# Patient Record
Sex: FEMALE | Race: WHITE | NOT HISPANIC OR LATINO | Employment: FULL TIME | URBAN - METROPOLITAN AREA
[De-identification: names, ages, dates, MRNs, and addresses within clinical notes are randomized per-mention and may not be internally consistent; named-entity substitution may affect disease eponyms.]

---

## 2023-02-07 PROBLEM — M25.562 KNEE PAIN, LEFT: Status: ACTIVE | Noted: 2023-02-07

## 2023-02-07 PROBLEM — M54.30 SCIATICA: Status: ACTIVE | Noted: 2023-02-07

## 2023-02-07 PROBLEM — E78.00 PURE HYPERCHOLESTEROLEMIA: Status: ACTIVE | Noted: 2023-02-07

## 2023-02-07 PROBLEM — R06.2 WHEEZE: Status: ACTIVE | Noted: 2023-02-07

## 2023-02-07 PROBLEM — I10 BENIGN HYPERTENSION: Status: ACTIVE | Noted: 2023-02-07

## 2023-02-07 PROBLEM — D75.1 ACQUIRED POLYCYTHEMIA: Status: ACTIVE | Noted: 2023-02-07

## 2023-02-07 PROBLEM — R73.03 PREDIABETES: Status: ACTIVE | Noted: 2023-02-07

## 2023-02-07 PROBLEM — B07.0 PLANTAR WART, LEFT FOOT: Status: ACTIVE | Noted: 2023-02-07

## 2023-02-07 PROBLEM — R51.9 HEADACHE: Status: ACTIVE | Noted: 2023-02-07

## 2023-02-07 PROBLEM — L98.9 SKIN LESION: Status: ACTIVE | Noted: 2023-02-07

## 2023-02-07 PROBLEM — H81.399 PERIPHERAL VERTIGO: Status: ACTIVE | Noted: 2023-02-07

## 2023-02-07 PROBLEM — R50.9 FEVER WITH CHILLS: Status: ACTIVE | Noted: 2023-02-07

## 2023-02-07 PROBLEM — R20.2 TINGLING OF UPPER EXTREMITY: Status: ACTIVE | Noted: 2023-02-07

## 2023-02-07 PROBLEM — J06.9 ACUTE URI: Status: ACTIVE | Noted: 2023-02-07

## 2023-02-07 PROBLEM — J30.9 ALLERGIC RHINITIS: Status: ACTIVE | Noted: 2023-02-07

## 2023-02-07 PROBLEM — E55.9 VITAMIN D DEFICIENCY: Status: ACTIVE | Noted: 2023-02-07

## 2023-02-07 PROBLEM — E78.1 HYPERTRIGLYCERIDEMIA: Status: ACTIVE | Noted: 2023-02-07

## 2023-02-07 PROBLEM — R43.2 TASTE IMPAIRMENT: Status: ACTIVE | Noted: 2023-02-07

## 2023-02-07 PROBLEM — K62.5 RECTAL BLEED: Status: ACTIVE | Noted: 2023-02-07

## 2023-02-07 PROBLEM — F17.200 SMOKING: Status: ACTIVE | Noted: 2023-02-07

## 2023-02-07 PROBLEM — R42 DIZZINESS: Status: ACTIVE | Noted: 2023-02-07

## 2023-02-07 PROBLEM — M25.511 RIGHT SHOULDER PAIN: Status: ACTIVE | Noted: 2023-02-07

## 2023-02-07 RX ORDER — LOSARTAN POTASSIUM AND HYDROCHLOROTHIAZIDE 12.5; 5 MG/1; MG/1
1 TABLET ORAL DAILY
COMMUNITY
Start: 2021-12-22 | End: 2023-03-21 | Stop reason: SDUPTHER

## 2023-02-07 RX ORDER — ATORVASTATIN CALCIUM 20 MG/1
1 TABLET, FILM COATED ORAL DAILY
COMMUNITY
Start: 2020-03-06 | End: 2023-03-21 | Stop reason: SDUPTHER

## 2023-03-13 LAB
ALANINE AMINOTRANSFERASE (SGPT) (U/L) IN SER/PLAS: 18 U/L (ref 7–45)
ALBUMIN (G/DL) IN SER/PLAS: 4.7 G/DL (ref 3.4–5)
ALKALINE PHOSPHATASE (U/L) IN SER/PLAS: 86 U/L (ref 33–110)
ANION GAP IN SER/PLAS: 13 MMOL/L (ref 10–20)
APPEARANCE, URINE: CLEAR
ASPARTATE AMINOTRANSFERASE (SGOT) (U/L) IN SER/PLAS: 16 U/L (ref 9–39)
BILIRUBIN TOTAL (MG/DL) IN SER/PLAS: 0.6 MG/DL (ref 0–1.2)
BILIRUBIN, URINE: NEGATIVE
BLOOD, URINE: NEGATIVE
CALCIDIOL (25 OH VITAMIN D3) (NG/ML) IN SER/PLAS: 79 NG/ML
CALCIUM (MG/DL) IN SER/PLAS: 9.6 MG/DL (ref 8.6–10.3)
CARBON DIOXIDE, TOTAL (MMOL/L) IN SER/PLAS: 31 MMOL/L (ref 21–32)
CHLORIDE (MMOL/L) IN SER/PLAS: 98 MMOL/L (ref 98–107)
CHOLESTEROL IN LDL (MG/DL) IN SER/PLAS BY DIRECT ASSAY: 127 MG/DL (ref 0–129)
COLOR, URINE: ABNORMAL
CREATININE (MG/DL) IN SER/PLAS: 0.57 MG/DL (ref 0.5–1.05)
ERYTHROCYTE DISTRIBUTION WIDTH (RATIO) BY AUTOMATED COUNT: 13.9 % (ref 11.5–14.5)
ERYTHROCYTE MEAN CORPUSCULAR HEMOGLOBIN CONCENTRATION (G/DL) BY AUTOMATED: 34 G/DL (ref 32–36)
ERYTHROCYTE MEAN CORPUSCULAR VOLUME (FL) BY AUTOMATED COUNT: 94 FL (ref 80–100)
ERYTHROCYTES (10*6/UL) IN BLOOD BY AUTOMATED COUNT: 4.81 X10E12/L (ref 4–5.2)
ESTIMATED AVERAGE GLUCOSE FOR HBA1C: 123 MG/DL
GFR FEMALE: >90 ML/MIN/1.73M2
GLUCOSE (MG/DL) IN SER/PLAS: 89 MG/DL (ref 74–99)
GLUCOSE, URINE: NEGATIVE MG/DL
HEMATOCRIT (%) IN BLOOD BY AUTOMATED COUNT: 45.3 % (ref 36–46)
HEMOGLOBIN (G/DL) IN BLOOD: 15.4 G/DL (ref 12–16)
HEMOGLOBIN A1C/HEMOGLOBIN TOTAL IN BLOOD: 5.9 %
KETONES, URINE: ABNORMAL MG/DL
LEUKOCYTE ESTERASE, URINE: NEGATIVE
LEUKOCYTES (10*3/UL) IN BLOOD BY AUTOMATED COUNT: 7.2 X10E9/L (ref 4.4–11.3)
NITRITE, URINE: NEGATIVE
PH, URINE: 7 (ref 5–8)
PLATELETS (10*3/UL) IN BLOOD AUTOMATED COUNT: 242 X10E9/L (ref 150–450)
POTASSIUM (MMOL/L) IN SER/PLAS: 4.1 MMOL/L (ref 3.5–5.3)
PROTEIN TOTAL: 6.5 G/DL (ref 6.4–8.2)
PROTEIN, URINE: NEGATIVE MG/DL
SODIUM (MMOL/L) IN SER/PLAS: 138 MMOL/L (ref 136–145)
SPECIFIC GRAVITY, URINE: 1 (ref 1–1.03)
THYROTROPIN (MIU/L) IN SER/PLAS BY DETECTION LIMIT <= 0.05 MIU/L: 1.31 MIU/L (ref 0.44–3.98)
UREA NITROGEN (MG/DL) IN SER/PLAS: 10 MG/DL (ref 6–23)
UROBILINOGEN, URINE: <2 MG/DL (ref 0–1.9)

## 2023-03-14 LAB
ALBUMIN (MG/L) IN URINE: <7 MG/L
ALBUMIN/CREATININE (UG/MG) IN URINE: NORMAL UG/MG CRT (ref 0–30)
CREATININE (MG/DL) IN URINE: 30.1 MG/DL (ref 20–320)

## 2023-03-21 ENCOUNTER — OFFICE VISIT (OUTPATIENT)
Dept: PRIMARY CARE | Facility: CLINIC | Age: 58
End: 2023-03-21
Payer: COMMERCIAL

## 2023-03-21 VITALS
SYSTOLIC BLOOD PRESSURE: 110 MMHG | BODY MASS INDEX: 31.02 KG/M2 | WEIGHT: 204 LBS | DIASTOLIC BLOOD PRESSURE: 70 MMHG | TEMPERATURE: 96.9 F

## 2023-03-21 DIAGNOSIS — F17.200 SMOKING: ICD-10-CM

## 2023-03-21 DIAGNOSIS — E78.00 PURE HYPERCHOLESTEROLEMIA: ICD-10-CM

## 2023-03-21 DIAGNOSIS — I10 BENIGN HYPERTENSION: Primary | ICD-10-CM

## 2023-03-21 DIAGNOSIS — R73.03 PREDIABETES: ICD-10-CM

## 2023-03-21 DIAGNOSIS — Z12.39 SCREENING BREAST EXAMINATION: ICD-10-CM

## 2023-03-21 PROCEDURE — 99213 OFFICE O/P EST LOW 20 MIN: CPT | Performed by: INTERNAL MEDICINE

## 2023-03-21 PROCEDURE — 3078F DIAST BP <80 MM HG: CPT | Performed by: INTERNAL MEDICINE

## 2023-03-21 PROCEDURE — 3074F SYST BP LT 130 MM HG: CPT | Performed by: INTERNAL MEDICINE

## 2023-03-21 RX ORDER — ATORVASTATIN CALCIUM 20 MG/1
20 TABLET, FILM COATED ORAL DAILY
Qty: 90 TABLET | Refills: 1 | Status: SHIPPED | OUTPATIENT
Start: 2023-03-21 | End: 2023-09-05 | Stop reason: SDUPTHER

## 2023-03-21 RX ORDER — LOSARTAN POTASSIUM AND HYDROCHLOROTHIAZIDE 12.5; 5 MG/1; MG/1
1 TABLET ORAL DAILY
Qty: 90 TABLET | Refills: 1 | Status: SHIPPED | OUTPATIENT
Start: 2023-03-21 | End: 2023-09-05 | Stop reason: SDUPTHER

## 2023-03-21 ASSESSMENT — PATIENT HEALTH QUESTIONNAIRE - PHQ9
2. FEELING DOWN, DEPRESSED OR HOPELESS: NOT AT ALL
SUM OF ALL RESPONSES TO PHQ9 QUESTIONS 1 AND 2: 0
1. LITTLE INTEREST OR PLEASURE IN DOING THINGS: NOT AT ALL

## 2023-03-21 NOTE — ASSESSMENT & PLAN NOTE
#Smoking:  -The patient is a chronic smoker, 18 cigarettes/day for almost 38 years. Currently smokes 8-10 cigarettes/day  -The patient was counseled about the importance of smoking cessation and its hazards on her health.  -The patient said she will be trying to quit smoking, resources were also provided for her when she is ready.

## 2023-03-21 NOTE — ASSESSMENT & PLAN NOTE
#Prediabetes  -Last HbA1c 6->5.9, patient's father has history of diabetes.  -Patient advised about weight loss, exercise and wise use of carbohydrates   -Repeat HBA1C next visit.

## 2023-03-21 NOTE — PROGRESS NOTES
Subjective   Patient ID: Jami Craig is a 57 y.o. female with PMH of HTN, HLD, chronic everyday smoker , obesity who presents for a follow up apt. States she has been trying to loose weight by cutting down carbohydrates and increasing protein in her diet. Patient was able to loose weight from 219lbs (11/2022)->204lbs (3/2023). BP in office was 11/70. 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 States she has been smoking 18cigarettes/day since age 39years and currently  smokes 8-10cigarettes/day. Denied n/v, f/c, HA, chest pain, SOB, palpitations, changes to urination or BM.    Objective   /70   Temp 36.1 °C (96.9 °F)   Wt 92.5 kg (204 lb)   BMI 31.02 kg/m²     Physical Exam  Constitutional:       General: She is not in acute distress.     Appearance: She is obese. She is not ill-appearing.   HENT:      Head: Atraumatic.      Nose: No congestion or rhinorrhea.   Cardiovascular:      Rate and Rhythm: Normal rate and regular rhythm.      Pulses: Normal pulses.      Heart sounds: Normal heart sounds. No murmur heard.     No friction rub. No gallop.   Pulmonary:      Effort: Pulmonary effort is normal. No respiratory distress.      Breath sounds: No wheezing or rales.   Chest:      Chest wall: No tenderness.   Abdominal:      General: Bowel sounds are normal. There is no distension.      Palpations: Abdomen is soft.      Tenderness: There is no abdominal tenderness. There is no guarding or rebound.   Musculoskeletal:         General: No swelling or tenderness. Normal range of motion.      Cervical back: No rigidity.   Skin:     General: Skin is warm and dry.   Neurological:      General: No focal deficit present.      Mental Status: She is alert and oriented to person, place, and time.   Psychiatric:         Mood and Affect: Mood normal.         Behavior: Behavior normal.         Assessment/Plan   Problem List Items Addressed This Visit          Circulatory    Benign  hypertension - Primary     HTN  Stable  C/w current meds  BW reviewed  Refills sent         Relevant Medications    losartan-hydrochlorothiazide (Hyzaar) 50-12.5 mg tablet       Endocrine/Metabolic    Prediabetes     #Prediabetes  -Last HbA1c 6->5.9, patient's father has history of diabetes.  -Patient advised about weight loss, exercise and wise use of carbohydrates   -Repeat HBA1C next visit.            Other    Pure hypercholesterolemia     Well controlled  Continue with statins  Labs done recently  Refills sent         Relevant Medications    atorvastatin (Lipitor) 20 mg tablet    Smoking     #Smoking:  -The patient is a chronic smoker, 18 cigarettes/day for almost 38 years. Currently smokes 8-10 cigarettes/day  -The patient was counseled about the importance of smoking cessation and its hazards on her health.  -The patient said she will be trying to quit smoking, resources were also provided for her when she is ready.            Other Visit Diagnoses       Screening breast examination        Relevant Orders    BI mammo bilateral screening tomosynthesis        -CBC, CMP, TSH with reflex to T4, vitamin D, lipid panel from last visit were normal.  -Urinalysis, urine albumin spot test: normal   -Patient's mammography was within normal last April, screening in 1 year. Requisition for April 2023 given.  -Patient mentioned she was following for OB/GYN and she had a last Pap smear was normal, records are not available in the system and the patient will bring with next visit.  -She got her booster vaccine against COVID in October, she got her pneumococcal vaccine today, she declined influenza vaccine.      Follow-up in 6 months (Sep 2023)

## 2023-06-01 DIAGNOSIS — R92.8 ABNORMAL MAMMOGRAM OF RIGHT BREAST: Primary | ICD-10-CM

## 2023-09-05 ENCOUNTER — TELEMEDICINE (OUTPATIENT)
Dept: PRIMARY CARE | Facility: CLINIC | Age: 58
End: 2023-09-05
Payer: COMMERCIAL

## 2023-09-05 DIAGNOSIS — I10 BENIGN HYPERTENSION: ICD-10-CM

## 2023-09-05 DIAGNOSIS — E55.9 VITAMIN D DEFICIENCY: Primary | ICD-10-CM

## 2023-09-05 DIAGNOSIS — E78.00 PURE HYPERCHOLESTEROLEMIA: ICD-10-CM

## 2023-09-05 PROCEDURE — 99213 OFFICE O/P EST LOW 20 MIN: CPT | Performed by: INTERNAL MEDICINE

## 2023-09-05 RX ORDER — ATORVASTATIN CALCIUM 20 MG/1
20 TABLET, FILM COATED ORAL DAILY
Qty: 90 TABLET | Refills: 1 | Status: SHIPPED | OUTPATIENT
Start: 2023-09-05 | End: 2024-02-13 | Stop reason: SDUPTHER

## 2023-09-05 RX ORDER — LOSARTAN POTASSIUM AND HYDROCHLOROTHIAZIDE 12.5; 5 MG/1; MG/1
1 TABLET ORAL DAILY
Qty: 90 TABLET | Refills: 1 | Status: SHIPPED | OUTPATIENT
Start: 2023-09-05 | End: 2024-02-13 | Stop reason: SDUPTHER

## 2023-09-05 ASSESSMENT — ENCOUNTER SYMPTOMS
CONSTITUTIONAL NEGATIVE: 1
CARDIOVASCULAR NEGATIVE: 1
RESPIRATORY NEGATIVE: 1
GASTROINTESTINAL NEGATIVE: 1

## 2023-09-05 NOTE — PROGRESS NOTES
Subjective   Patient ID: Jami Craig is a 58 y.o. female who presents for Follow-up.  HPI    Review of Systems   Constitutional: Negative.    Respiratory: Negative.     Cardiovascular: Negative.    Gastrointestinal: Negative.        Objective   Physical Exam  Neurological:      Mental Status: She is alert.   Psychiatric:         Mood and Affect: Mood normal.         Assessment/Plan   Problem List Items Addressed This Visit       Benign hypertension    Relevant Medications    losartan-hydrochlorothiazide (Hyzaar) 50-12.5 mg tablet    Pure hypercholesterolemia    Relevant Medications    atorvastatin (Lipitor) 20 mg tablet    Vitamin D deficiency - Primary        The patient is here for a follow up on chronic medical problems.  His medications were reviewed, pharmacy updated, notes reviewed, prior labs reviewed.       HTN.  Well controlled.  Continue with current medications.  Check BP at home daily.  Report to us if the numbers are higher than 130/80.        HLD  Stable  Continue with statins  Check lipids    An interactive audio and video telecommunication system which permits real time communications between the patient (at the originating site) and provider (at the distant site) was utilized to provide this telehealth service.    Verbal consent was requested and obtained from the patient on the day of encounter.      Mee Maciel MD

## 2023-10-31 ENCOUNTER — OFFICE VISIT (OUTPATIENT)
Dept: PRIMARY CARE | Facility: CLINIC | Age: 58
End: 2023-10-31
Payer: COMMERCIAL

## 2023-10-31 ENCOUNTER — LAB (OUTPATIENT)
Dept: LAB | Facility: LAB | Age: 58
End: 2023-10-31
Payer: COMMERCIAL

## 2023-10-31 VITALS — SYSTOLIC BLOOD PRESSURE: 130 MMHG | WEIGHT: 228 LBS | BODY MASS INDEX: 34.67 KG/M2 | DIASTOLIC BLOOD PRESSURE: 84 MMHG

## 2023-10-31 DIAGNOSIS — E55.9 VITAMIN D DEFICIENCY: ICD-10-CM

## 2023-10-31 DIAGNOSIS — I10 BENIGN HYPERTENSION: ICD-10-CM

## 2023-10-31 DIAGNOSIS — Z13.220 LIPID SCREENING: Primary | ICD-10-CM

## 2023-10-31 DIAGNOSIS — E78.00 PURE HYPERCHOLESTEROLEMIA: ICD-10-CM

## 2023-10-31 DIAGNOSIS — Z13.220 LIPID SCREENING: ICD-10-CM

## 2023-10-31 DIAGNOSIS — R73.03 PREDIABETES: ICD-10-CM

## 2023-10-31 DIAGNOSIS — Z00.00 ROUTINE GENERAL MEDICAL EXAMINATION AT A HEALTH CARE FACILITY: ICD-10-CM

## 2023-10-31 DIAGNOSIS — Z12.4 SCREENING FOR CERVICAL CANCER: ICD-10-CM

## 2023-10-31 DIAGNOSIS — F17.200 SMOKING: ICD-10-CM

## 2023-10-31 LAB
ALBUMIN SERPL BCP-MCNC: 4.6 G/DL (ref 3.4–5)
ALP SERPL-CCNC: 93 U/L (ref 33–110)
ALT SERPL W P-5'-P-CCNC: 24 U/L (ref 7–45)
ANION GAP SERPL CALC-SCNC: 12 MMOL/L (ref 10–20)
AST SERPL W P-5'-P-CCNC: 18 U/L (ref 9–39)
BILIRUB SERPL-MCNC: 0.6 MG/DL (ref 0–1.2)
BUN SERPL-MCNC: 10 MG/DL (ref 6–23)
CALCIUM SERPL-MCNC: 10.1 MG/DL (ref 8.6–10.6)
CHLORIDE SERPL-SCNC: 102 MMOL/L (ref 98–107)
CHOLEST SERPL-MCNC: 203 MG/DL (ref 0–199)
CHOLESTEROL/HDL RATIO: 3.6
CO2 SERPL-SCNC: 32 MMOL/L (ref 21–32)
CREAT SERPL-MCNC: 0.6 MG/DL (ref 0.5–1.05)
CREAT UR-MCNC: 23.4 MG/DL (ref 20–320)
EST. AVERAGE GLUCOSE BLD GHB EST-MCNC: 120 MG/DL
GFR SERPL CREATININE-BSD FRML MDRD: >90 ML/MIN/1.73M*2
GLUCOSE SERPL-MCNC: 101 MG/DL (ref 74–99)
HBA1C MFR BLD: 5.8 %
HCV AB SER QL: NONREACTIVE
HDLC SERPL-MCNC: 55.9 MG/DL
HIV 1+2 AB+HIV1 P24 AG SERPL QL IA: NONREACTIVE
LDLC SERPL CALC-MCNC: 99 MG/DL
MICROALBUMIN UR-MCNC: <7 MG/L
MICROALBUMIN/CREAT UR: NORMAL MG/G{CREAT}
NON HDL CHOLESTEROL: 147 MG/DL (ref 0–149)
POTASSIUM SERPL-SCNC: 4.4 MMOL/L (ref 3.5–5.3)
PROT SERPL-MCNC: 7.1 G/DL (ref 6.4–8.2)
SODIUM SERPL-SCNC: 142 MMOL/L (ref 136–145)
TRIGL SERPL-MCNC: 242 MG/DL (ref 0–149)
VLDL: 48 MG/DL (ref 0–40)

## 2023-10-31 PROCEDURE — 99396 PREV VISIT EST AGE 40-64: CPT | Performed by: INTERNAL MEDICINE

## 2023-10-31 PROCEDURE — 3079F DIAST BP 80-89 MM HG: CPT | Performed by: INTERNAL MEDICINE

## 2023-10-31 PROCEDURE — 80061 LIPID PANEL: CPT

## 2023-10-31 PROCEDURE — 93000 ELECTROCARDIOGRAM COMPLETE: CPT | Performed by: INTERNAL MEDICINE

## 2023-10-31 PROCEDURE — 36415 COLL VENOUS BLD VENIPUNCTURE: CPT

## 2023-10-31 PROCEDURE — 86803 HEPATITIS C AB TEST: CPT

## 2023-10-31 PROCEDURE — 82043 UR ALBUMIN QUANTITATIVE: CPT

## 2023-10-31 PROCEDURE — 80053 COMPREHEN METABOLIC PANEL: CPT

## 2023-10-31 PROCEDURE — 82570 ASSAY OF URINE CREATININE: CPT

## 2023-10-31 PROCEDURE — 87389 HIV-1 AG W/HIV-1&-2 AB AG IA: CPT

## 2023-10-31 PROCEDURE — 83036 HEMOGLOBIN GLYCOSYLATED A1C: CPT

## 2023-10-31 PROCEDURE — 3075F SYST BP GE 130 - 139MM HG: CPT | Performed by: INTERNAL MEDICINE

## 2023-10-31 ASSESSMENT — PATIENT HEALTH QUESTIONNAIRE - PHQ9
2. FEELING DOWN, DEPRESSED OR HOPELESS: NOT AT ALL
1. LITTLE INTEREST OR PLEASURE IN DOING THINGS: NOT AT ALL
SUM OF ALL RESPONSES TO PHQ9 QUESTIONS 1 AND 2: 0

## 2023-10-31 NOTE — PROGRESS NOTES
Chief Complaint:   Chief Complaint   Patient presents with    Annual Exam      Subjective     HPI    58 y.o. female with a PMH significant for hypertension, hyperlipidemia and nicotine dependency presents to the clinic for her annual physical.  Blood pressure is well controlled at 130/84 in the office.  Mammogram done this year demonstrated areas of fibroglandular tissue otherwise unremarkable.  Colonoscopy performed in 2016 with recommendation to be done every 10 years otherwise unremarkable.  She politely declined immunizations at this time.  She continues to smoke approximately 10 cigarettes/day but is in the process of quitting.  She was provided counseling and explained the benefits of quitting smoking and provided further help when she is ready to quit.  Review systems otherwise unremarkable.    ROS   Review of Systems   All other systems reviewed and are negative.       Objective    Visit Vitals  /84      BMI Readings from Last 15 Encounters:   10/31/23 34.67 kg/m²   03/21/23 31.02 kg/m²   11/29/22 33.30 kg/m²   08/18/22 33.75 kg/m²   04/28/22 33.45 kg/m²   12/17/21 37.71 kg/m²   08/11/21 36.95 kg/m²   04/14/21 36.95 kg/m²   01/11/21 40.14 kg/m²   10/14/20 38.32 kg/m²   08/06/20 37.56 kg/m²   06/17/20 2768.36 kg/m²      Lab Results   Component Value Date    HGBA1C 5.9 (A) 03/13/2023      Lab Results   Component Value Date    HGBA1C 5.9 (A) 03/13/2023    HGBA1C 6.0 (A) 08/18/2022     Lab Results   Component Value Date    CREATININE 0.57 03/13/2023      Lab Results   Component Value Date    WBC 7.2 03/13/2023    HGB 15.4 03/13/2023    HCT 45.3 03/13/2023    MCV 94 03/13/2023     03/13/2023        Chemistry    Lab Results   Component Value Date/Time     03/13/2023 1033    K 4.1 03/13/2023 1033    CL 98 03/13/2023 1033    CO2 31 03/13/2023 1033    BUN 10 03/13/2023 1033    CREATININE 0.57 03/13/2023 1033    Lab Results   Component Value Date/Time    CALCIUM 9.6 03/13/2023 1033    ALKPHOS 86  03/13/2023 1033    AST 16 03/13/2023 1033    ALT 18 03/13/2023 1033    BILITOT 0.6 03/13/2023 1033             Physical Exam  Physical Exam  Constitutional:       General: She is not in acute distress.  HENT:      Head: Normocephalic and atraumatic.      Nose: Nose normal.   Cardiovascular:      Heart sounds: Normal heart sounds.   Pulmonary:      Effort: Pulmonary effort is normal.      Breath sounds: Normal breath sounds.   Abdominal:      General: Bowel sounds are normal.      Palpations: Abdomen is soft.   Musculoskeletal:         General: Normal range of motion.   Skin:     General: Skin is warm.   Neurological:      General: No focal deficit present.      Mental Status: She is alert and oriented to person, place, and time. Mental status is at baseline.   Psychiatric:         Mood and Affect: Mood normal.         Behavior: Behavior normal.         Thought Content: Thought content normal.         Judgment: Judgment normal.          Assessment/Plan    The following medical issues were discussed during this encounter :     #Hypertension  -Blood pressure taken in the office 130/84  -We will continue current medical management which include losartan/hydrochlorothiazide 50-12.5 mg    #Hyperlipidemia  -Continue atorvastatin 20 mg daily    #Nicotine dependence  -Provided counseling and explained the benefits of quitting smoking offered alternatives to aid in quitting cigarettes    # Misc:   -EKG done in the office : unremarkable  -CT cardiac scoring: reassess at next visit   -Low dose CT scan: Reassess at next visit         The following labs were ordered to be completed before the patient's next visit:   CMP,urine albumin,HbA1c, HIV and HCV, lipid profile     Health maintenance  The following screening tests were ordered: Mammogram UTD,Colonoscopy UTD , declined cardiac scoring at this time     Immunizations: Politely declined at this time        Please return in 3 months or if symptoms worsen     Stiven Zurita MD

## 2023-10-31 NOTE — PROGRESS NOTES
I saw and evaluated the patient. I personally obtained the key and critical portions of the history and physical exam or was physically present for key and critical portions performed by the resident/fellow. I reviewed the resident/fellow's documentation and discussed the patient with the resident/fellow. I agree with the resident/fellow's medical decision making as documented in the note.    Mee Maciel MD

## 2024-02-13 ENCOUNTER — LAB (OUTPATIENT)
Dept: LAB | Facility: LAB | Age: 59
End: 2024-02-13
Payer: COMMERCIAL

## 2024-02-13 ENCOUNTER — OFFICE VISIT (OUTPATIENT)
Dept: PRIMARY CARE | Facility: CLINIC | Age: 59
End: 2024-02-13
Payer: COMMERCIAL

## 2024-02-13 VITALS — BODY MASS INDEX: 34.97 KG/M2 | SYSTOLIC BLOOD PRESSURE: 130 MMHG | WEIGHT: 230 LBS | DIASTOLIC BLOOD PRESSURE: 84 MMHG

## 2024-02-13 DIAGNOSIS — E78.00 PURE HYPERCHOLESTEROLEMIA: ICD-10-CM

## 2024-02-13 DIAGNOSIS — I10 BENIGN HYPERTENSION: ICD-10-CM

## 2024-02-13 DIAGNOSIS — R73.03 PREDIABETES: ICD-10-CM

## 2024-02-13 DIAGNOSIS — F17.200 SMOKING: Primary | ICD-10-CM

## 2024-02-13 DIAGNOSIS — F17.200 SMOKING: ICD-10-CM

## 2024-02-13 DIAGNOSIS — E78.00 PURE HYPERCHOLESTEROLEMIA: Primary | ICD-10-CM

## 2024-02-13 LAB
ALBUMIN SERPL BCP-MCNC: 4.4 G/DL (ref 3.4–5)
ALP SERPL-CCNC: 91 U/L (ref 33–110)
ALT SERPL W P-5'-P-CCNC: 22 U/L (ref 7–45)
ANION GAP SERPL CALC-SCNC: 11 MMOL/L (ref 10–20)
AST SERPL W P-5'-P-CCNC: 16 U/L (ref 9–39)
BILIRUB SERPL-MCNC: 0.5 MG/DL (ref 0–1.2)
BUN SERPL-MCNC: 11 MG/DL (ref 6–23)
CALCIUM SERPL-MCNC: 10.1 MG/DL (ref 8.6–10.6)
CHLORIDE SERPL-SCNC: 100 MMOL/L (ref 98–107)
CO2 SERPL-SCNC: 34 MMOL/L (ref 21–32)
CREAT SERPL-MCNC: 0.61 MG/DL (ref 0.5–1.05)
EGFRCR SERPLBLD CKD-EPI 2021: >90 ML/MIN/1.73M*2
EST. AVERAGE GLUCOSE BLD GHB EST-MCNC: 123 MG/DL
GLUCOSE SERPL-MCNC: 110 MG/DL (ref 74–99)
HBA1C MFR BLD: 5.9 %
POTASSIUM SERPL-SCNC: 3.8 MMOL/L (ref 3.5–5.3)
PROT SERPL-MCNC: 6.9 G/DL (ref 6.4–8.2)
SODIUM SERPL-SCNC: 141 MMOL/L (ref 136–145)

## 2024-02-13 PROCEDURE — 83036 HEMOGLOBIN GLYCOSYLATED A1C: CPT

## 2024-02-13 PROCEDURE — 99406 BEHAV CHNG SMOKING 3-10 MIN: CPT | Performed by: INTERNAL MEDICINE

## 2024-02-13 PROCEDURE — 36415 COLL VENOUS BLD VENIPUNCTURE: CPT

## 2024-02-13 PROCEDURE — 3075F SYST BP GE 130 - 139MM HG: CPT | Performed by: INTERNAL MEDICINE

## 2024-02-13 PROCEDURE — 80053 COMPREHEN METABOLIC PANEL: CPT

## 2024-02-13 PROCEDURE — 99214 OFFICE O/P EST MOD 30 MIN: CPT | Performed by: INTERNAL MEDICINE

## 2024-02-13 PROCEDURE — 3079F DIAST BP 80-89 MM HG: CPT | Performed by: INTERNAL MEDICINE

## 2024-02-13 RX ORDER — LOSARTAN POTASSIUM AND HYDROCHLOROTHIAZIDE 12.5; 5 MG/1; MG/1
1 TABLET ORAL DAILY
Qty: 90 TABLET | Refills: 1 | Status: SHIPPED | OUTPATIENT
Start: 2024-02-13 | End: 2024-06-11 | Stop reason: SDUPTHER

## 2024-02-13 RX ORDER — ATORVASTATIN CALCIUM 20 MG/1
20 TABLET, FILM COATED ORAL DAILY
Qty: 90 TABLET | Refills: 1 | Status: SHIPPED | OUTPATIENT
Start: 2024-02-13 | End: 2024-06-11 | Stop reason: SDUPTHER

## 2024-02-13 ASSESSMENT — PATIENT HEALTH QUESTIONNAIRE - PHQ9
1. LITTLE INTEREST OR PLEASURE IN DOING THINGS: NOT AT ALL
2. FEELING DOWN, DEPRESSED OR HOPELESS: NOT AT ALL
SUM OF ALL RESPONSES TO PHQ9 QUESTIONS 1 AND 2: 0

## 2024-02-13 NOTE — PROGRESS NOTES
Chief Complaint:   Chief Complaint   Patient presents with    Follow-up    Med Refill      Subjective     HPI    58 y.o. female with a PMH significant for hypertension, hyperlipidemia and nicotine dependency presents to the clinic for her annual physical.  Blood pressure is well controlled at 130/94 in the office.  Mammogram done this year demonstrated areas of fibroglandular tissue otherwise unremarkable.  Colonoscopy performed in 2016 with recommendation to be done every 10 years otherwise unremarkable.  She politely declined immunizations at this time.  She continues to smoke approximately 10 cigarettes/day but is in the process of quitting.  She was provided counseling and explained the benefits of quitting smoking and provided further help when she is ready to quit.  Lastly the patient was complaining of ear fullness in the left ear which appears to be impacted with wax/cerumen which we will irrigate in the office today.  She denies ear discharge, headaches dizziness vertigo.  Review systems otherwise unremarkable.    ROS   Review of Systems   All other systems reviewed and are negative.       Objective    Visit Vitals  /84        BMI Readings from Last 15 Encounters:   02/13/24 34.97 kg/m²   10/31/23 34.67 kg/m²   03/21/23 31.02 kg/m²   11/29/22 33.30 kg/m²   08/18/22 33.75 kg/m²   04/28/22 33.45 kg/m²   12/17/21 37.71 kg/m²   08/11/21 36.95 kg/m²   04/14/21 36.95 kg/m²   01/11/21 40.14 kg/m²   10/14/20 38.32 kg/m²   08/06/20 37.56 kg/m²   06/17/20 2768.36 kg/m²      Lab Results   Component Value Date    HGBA1C 5.8 (H) 10/31/2023      Lab Results   Component Value Date    HGBA1C 5.8 (H) 10/31/2023    HGBA1C 5.9 (A) 03/13/2023    HGBA1C 6.0 (A) 08/18/2022     Lab Results   Component Value Date    LDLCALC 99 10/31/2023    CREATININE 0.60 10/31/2023      Lab Results   Component Value Date    WBC 7.2 03/13/2023    HGB 15.4 03/13/2023    HCT 45.3 03/13/2023    MCV 94 03/13/2023     03/13/2023         Chemistry    Lab Results   Component Value Date/Time     10/31/2023 0928    K 4.4 10/31/2023 0928     10/31/2023 0928    CO2 32 10/31/2023 0928    BUN 10 10/31/2023 0928    CREATININE 0.60 10/31/2023 0928    Lab Results   Component Value Date/Time    CALCIUM 10.1 10/31/2023 0928    ALKPHOS 93 10/31/2023 0928    AST 18 10/31/2023 0928    ALT 24 10/31/2023 0928    BILITOT 0.6 10/31/2023 0928             Physical Exam  Physical Exam  Constitutional:       General: She is not in acute distress.  HENT:      Head: Normocephalic and atraumatic.      Nose: Nose normal.   Cardiovascular:      Heart sounds: Normal heart sounds.   Pulmonary:      Effort: Pulmonary effort is normal.      Breath sounds: Normal breath sounds.   Abdominal:      General: Bowel sounds are normal.      Palpations: Abdomen is soft.   Musculoskeletal:         General: Normal range of motion.   Skin:     General: Skin is warm.   Neurological:      General: No focal deficit present.      Mental Status: She is alert and oriented to person, place, and time. Mental status is at baseline.   Psychiatric:         Mood and Affect: Mood normal.         Behavior: Behavior normal.         Thought Content: Thought content normal.         Judgment: Judgment normal.          Assessment/Plan    The following medical issues were discussed during this encounter :     #Left ear cerumen removal  -Left ear irrigated with hydrogen peroxide in the office  -Cerumen removed successfully with no complications    #Hypertension  -Blood pressure taken in the office 130/84  -We will continue current medical management which include losartan/hydrochlorothiazide 50-12.5 mg    #Hyperlipidemia  -Continue atorvastatin 20 mg daily    # Misc:   -CT cardiac scoring: Ordered and pending  -Low dose CT scan: Ordered and pending     Immunizations: Politely declined at this time        Please return in 4 months or if symptoms worsen     Stiven Zurita MD

## 2024-02-13 NOTE — PATIENT INSTRUCTIONS
By optimizing your health through good nutrition, daily exercise, stress management, low/moderate alcohol and avoidance of tobacco, sugar and processed foods, you can help to decrease your risk of cardiovascular disease and stroke and achieve a healthy weight. A diet rich in whole, plant-based foods such as vegetables, beans, seeds, nuts, whole grains, healthy fats and fruit - leads to lower body mass index, blood pressure, HbA1C, and cholesterol levels.    Heart Healthy Tips:     -We recommend you follow a heart healthy diet. Watch food labels and try not to  eat more than 2,500 mg of sodium per day. Avoid foods high in salt like  processed meats (lunch meats, llanes, and sausage), processed foods (boxed  dinners, canned soups), fried and fast foods. Monitor serving sizes and if the  sodium per serving size is more than 200 mg, avoid those foods. If the sodium  per serving size is between 100-200 mg, you can use those in limited quantities.  Try to choose foods where the amount of sodium per serving size is less than 100  mg. Try to eat a diet rich in fruits and vegetables, whole grains, low fat dairy  products, skinless poultry and fish, nuts, beans, non-tropical vegetable oils.  Limit saturated fat, trans fat, sodium, red meats, and sugar-sweetened  beverages. Limit alcohol    -The combination of a reduced-calorie diet and increased physical activity is  recommended. Adults should aim to get at least 150 minutes of moderate physical  activity per week (30 minutes of moderate physical activities at least 5 days  per week). Examples of moderate physical activities include brisk walking,  swimming, aerobic dancing, heavy gardening, jumping rope, bicycling 10 MPH or  faster, tennis, hiking uphill or with a heavy backpack. Please let us know if  you would like to learn more about your nutrition and calories and additional  options including weight loss programs to help you reach your goal.     -If you smoke, stop  smoking. If you stop smoking you can help get rid of a major  source of stress to your heart. Smoking makes your heart rate and blood pressure  go up and increases your risk or developing cardiovascular diseases and worsen  symptoms associated with heart failure.     -Obtain a BP monitor and monitor your BP daily. Check it around the same time  each day; at least 1 hour after taking your medications. Record your BP in a log  and bring your log with you to your doctor?s appointment.

## 2024-06-11 ENCOUNTER — OFFICE VISIT (OUTPATIENT)
Dept: PRIMARY CARE | Facility: CLINIC | Age: 59
End: 2024-06-11
Payer: COMMERCIAL

## 2024-06-11 VITALS — BODY MASS INDEX: 35.88 KG/M2 | DIASTOLIC BLOOD PRESSURE: 80 MMHG | WEIGHT: 236 LBS | SYSTOLIC BLOOD PRESSURE: 124 MMHG

## 2024-06-11 DIAGNOSIS — R73.03 PREDIABETES: ICD-10-CM

## 2024-06-11 DIAGNOSIS — E78.1 HYPERTRIGLYCERIDEMIA: ICD-10-CM

## 2024-06-11 DIAGNOSIS — I10 BENIGN HYPERTENSION: Primary | ICD-10-CM

## 2024-06-11 DIAGNOSIS — E78.00 PURE HYPERCHOLESTEROLEMIA: ICD-10-CM

## 2024-06-11 DIAGNOSIS — E55.9 VITAMIN D DEFICIENCY: ICD-10-CM

## 2024-06-11 PROCEDURE — 3079F DIAST BP 80-89 MM HG: CPT | Performed by: INTERNAL MEDICINE

## 2024-06-11 PROCEDURE — 99214 OFFICE O/P EST MOD 30 MIN: CPT | Performed by: INTERNAL MEDICINE

## 2024-06-11 PROCEDURE — 3074F SYST BP LT 130 MM HG: CPT | Performed by: INTERNAL MEDICINE

## 2024-06-11 RX ORDER — ATORVASTATIN CALCIUM 20 MG/1
20 TABLET, FILM COATED ORAL DAILY
Qty: 90 TABLET | Refills: 1 | Status: SHIPPED | OUTPATIENT
Start: 2024-06-11

## 2024-06-11 RX ORDER — LOSARTAN POTASSIUM AND HYDROCHLOROTHIAZIDE 12.5; 5 MG/1; MG/1
1 TABLET ORAL DAILY
Qty: 90 TABLET | Refills: 1 | Status: SHIPPED | OUTPATIENT
Start: 2024-06-11

## 2024-06-11 NOTE — PROGRESS NOTES
Chief Complaint:   Chief Complaint   Patient presents with    Follow-up    Med Refill      Subjective     HPI    58 y.o. female with a PMH significant for hypertension, hyperlipidemia and nicotine dependency presents to the clinic for follow up and medication refill.  Blood pressure is well controlled at 124/80 in the office. She continues to smoke approximately 10 cigarettes/day but is in the process of quitting.  She was provided counseling and explained the benefits of quitting smoking and provided further help when she is ready to quit.No acute concerns or new complaints. She denies any recent illness, fever, chills, sweats, chest pain, SOB, cough, palpitations, N/V, abdominal pain, diarrhea, melena, hematochezia, or urinary symptoms. ROS otherwise negative.    ROS        Objective    Visit Vitals  /80        BMI Readings from Last 15 Encounters:   06/11/24 35.88 kg/m²   02/13/24 34.97 kg/m²   10/31/23 34.67 kg/m²   03/21/23 31.02 kg/m²   11/29/22 33.30 kg/m²   08/18/22 33.75 kg/m²   04/28/22 33.45 kg/m²   12/17/21 37.71 kg/m²   08/11/21 36.95 kg/m²   04/14/21 36.95 kg/m²   01/11/21 40.14 kg/m²   10/14/20 38.32 kg/m²   08/06/20 37.56 kg/m²   06/17/20 2768.36 kg/m²      Lab Results   Component Value Date    HGBA1C 5.9 (H) 02/13/2024      Lab Results   Component Value Date    HGBA1C 5.9 (H) 02/13/2024    HGBA1C 5.8 (H) 10/31/2023    HGBA1C 5.9 (A) 03/13/2023     Lab Results   Component Value Date    LDLCALC 99 10/31/2023    CREATININE 0.61 02/13/2024      Lab Results   Component Value Date    WBC 7.2 03/13/2023    HGB 15.4 03/13/2023    HCT 45.3 03/13/2023    MCV 94 03/13/2023     03/13/2023        Chemistry    Lab Results   Component Value Date/Time     02/13/2024 0939    K 3.8 02/13/2024 0939     02/13/2024 0939    CO2 34 (H) 02/13/2024 0939    BUN 11 02/13/2024 0939    CREATININE 0.61 02/13/2024 0939    Lab Results   Component Value Date/Time    CALCIUM 10.1 02/13/2024 0939    ALKPHOS  91 02/13/2024 0939    AST 16 02/13/2024 0939    ALT 22 02/13/2024 0939    BILITOT 0.5 02/13/2024 0939             Physical Exam  Physical Exam  Constitutional:       General: She is not in acute distress.  HENT:      Head: Normocephalic and atraumatic.      Nose: Nose normal.   Cardiovascular:      Heart sounds: Normal heart sounds.   Pulmonary:      Effort: Pulmonary effort is normal.      Breath sounds: Normal breath sounds.   Abdominal:      General: Bowel sounds are normal.      Palpations: Abdomen is soft.   Musculoskeletal:         General: Normal range of motion.   Skin:     General: Skin is warm.   Neurological:      General: No focal deficit present.      Mental Status: She is alert and oriented to person, place, and time. Mental status is at baseline.   Psychiatric:         Mood and Affect: Mood normal.         Behavior: Behavior normal.         Thought Content: Thought content normal.         Judgment: Judgment normal.        Assessment/Plan    The following medical issues were discussed during this encounter:     #Hypertension  -Blood pressure taken in the office 124/80  -We will continue current medical management which include losartan/hydrochlorothiazide 50-12.5 mg    #Hyperlipidemia  -Continue atorvastatin 20 mg daily    # Misc:   -CT cardiac scoring: Scheduled and pending  -Low dose CT scan: Scheduled and pending   -mammogram and PAP through OBGYN        Please return in 4 months or if symptoms worsen     Stiven Zurita MD

## 2024-06-11 NOTE — PROGRESS NOTES
I reviewed the resident/fellow's documentation and discussed the patient with the resident/fellow. I agree with the resident/fellow's medical decision making as documented in the note.  As the attending physician, I certify that I personally reviewed the patient's history and personally examined the patient to confirm the physical findings described above, and that I reviewed the relevant imaging studies and available reports. I also discussed the differential diagnosis and all of the proposed management plans with the patient and individuals accompanying the patient to this visit. They had the opportunity to ask questions about the proposed management plans and to have those questions answered.     Mee Maciel MD

## 2024-07-15 ENCOUNTER — HOSPITAL ENCOUNTER (OUTPATIENT)
Dept: RADIOLOGY | Facility: HOSPITAL | Age: 59
Discharge: HOME | End: 2024-07-15
Payer: COMMERCIAL

## 2024-07-15 VITALS — HEIGHT: 68 IN | BODY MASS INDEX: 36.37 KG/M2 | WEIGHT: 240 LBS

## 2024-07-15 DIAGNOSIS — Z12.31 ENCOUNTER FOR SCREENING MAMMOGRAM FOR MALIGNANT NEOPLASM OF BREAST: ICD-10-CM

## 2024-07-15 PROCEDURE — 77067 SCR MAMMO BI INCL CAD: CPT

## 2024-07-15 PROCEDURE — 77067 SCR MAMMO BI INCL CAD: CPT | Performed by: RADIOLOGY

## 2024-07-15 PROCEDURE — 77063 BREAST TOMOSYNTHESIS BI: CPT | Performed by: RADIOLOGY

## 2024-09-16 ENCOUNTER — HOSPITAL ENCOUNTER (OUTPATIENT)
Dept: RADIOLOGY | Facility: HOSPITAL | Age: 59
Discharge: HOME | End: 2024-09-16
Payer: COMMERCIAL

## 2024-09-16 DIAGNOSIS — F17.200 SMOKING: ICD-10-CM

## 2024-09-16 DIAGNOSIS — E78.00 PURE HYPERCHOLESTEROLEMIA: ICD-10-CM

## 2024-09-16 DIAGNOSIS — R73.03 PREDIABETES: ICD-10-CM

## 2024-09-16 PROCEDURE — 75571 CT HRT W/O DYE W/CA TEST: CPT

## 2024-09-16 PROCEDURE — 71271 CT THORAX LUNG CANCER SCR C-: CPT

## 2024-09-16 NOTE — RESULT ENCOUNTER NOTE
Results reviewed.  There are some minor abnormalities, which are not concerning.  No changes in medications are needed at this time.  Please continue with current treatment.    Best,  Dr. Caban

## 2024-10-02 ENCOUNTER — APPOINTMENT (OUTPATIENT)
Dept: PRIMARY CARE | Facility: CLINIC | Age: 59
End: 2024-10-02
Payer: COMMERCIAL

## 2024-10-02 ENCOUNTER — LAB (OUTPATIENT)
Dept: LAB | Facility: LAB | Age: 59
End: 2024-10-02
Payer: COMMERCIAL

## 2024-10-02 VITALS
WEIGHT: 244 LBS | HEIGHT: 68 IN | HEART RATE: 82 BPM | BODY MASS INDEX: 36.98 KG/M2 | SYSTOLIC BLOOD PRESSURE: 140 MMHG | DIASTOLIC BLOOD PRESSURE: 80 MMHG

## 2024-10-02 DIAGNOSIS — I10 BENIGN HYPERTENSION: Primary | ICD-10-CM

## 2024-10-02 DIAGNOSIS — R73.03 PRE-DIABETES: ICD-10-CM

## 2024-10-02 DIAGNOSIS — I10 BENIGN HYPERTENSION: ICD-10-CM

## 2024-10-02 DIAGNOSIS — E78.00 PURE HYPERCHOLESTEROLEMIA: ICD-10-CM

## 2024-10-02 DIAGNOSIS — E66.9 OBESITY (BMI 30-39.9): ICD-10-CM

## 2024-10-02 LAB
25(OH)D3 SERPL-MCNC: 51 NG/ML (ref 30–100)
ALBUMIN SERPL BCP-MCNC: 4.7 G/DL (ref 3.4–5)
ALP SERPL-CCNC: 92 U/L (ref 33–110)
ALT SERPL W P-5'-P-CCNC: 32 U/L (ref 7–45)
ANION GAP SERPL CALC-SCNC: 14 MMOL/L (ref 10–20)
AST SERPL W P-5'-P-CCNC: 16 U/L (ref 9–39)
BILIRUB SERPL-MCNC: 0.4 MG/DL (ref 0–1.2)
BUN SERPL-MCNC: 10 MG/DL (ref 6–23)
CALCIUM SERPL-MCNC: 9.3 MG/DL (ref 8.6–10.6)
CHLORIDE SERPL-SCNC: 104 MMOL/L (ref 98–107)
CHOLEST SERPL-MCNC: 195 MG/DL (ref 0–199)
CHOLESTEROL/HDL RATIO: 3.5
CO2 SERPL-SCNC: 28 MMOL/L (ref 21–32)
CREAT SERPL-MCNC: 0.54 MG/DL (ref 0.5–1.05)
CREAT UR-MCNC: 45.5 MG/DL (ref 20–320)
EGFRCR SERPLBLD CKD-EPI 2021: >90 ML/MIN/1.73M*2
ERYTHROCYTE [DISTWIDTH] IN BLOOD BY AUTOMATED COUNT: 13.6 % (ref 11.5–14.5)
GLUCOSE SERPL-MCNC: 94 MG/DL (ref 74–99)
HCT VFR BLD AUTO: 43.6 % (ref 36–46)
HDLC SERPL-MCNC: 56.4 MG/DL
HGB BLD-MCNC: 14.3 G/DL (ref 12–16)
LDLC SERPL CALC-MCNC: 109 MG/DL
MCH RBC QN AUTO: 31.4 PG (ref 26–34)
MCHC RBC AUTO-ENTMCNC: 32.8 G/DL (ref 32–36)
MCV RBC AUTO: 96 FL (ref 80–100)
MICROALBUMIN UR-MCNC: <7 MG/L
MICROALBUMIN/CREAT UR: NORMAL MG/G{CREAT}
NON HDL CHOLESTEROL: 139 MG/DL (ref 0–149)
NRBC BLD-RTO: 0 /100 WBCS (ref 0–0)
PLATELET # BLD AUTO: 237 X10*3/UL (ref 150–450)
POTASSIUM SERPL-SCNC: 4.3 MMOL/L (ref 3.5–5.3)
PROT SERPL-MCNC: 6.9 G/DL (ref 6.4–8.2)
RBC # BLD AUTO: 4.56 X10*6/UL (ref 4–5.2)
SODIUM SERPL-SCNC: 142 MMOL/L (ref 136–145)
TRIGL SERPL-MCNC: 148 MG/DL (ref 0–149)
TSH SERPL-ACNC: 1.2 MIU/L (ref 0.44–3.98)
VLDL: 30 MG/DL (ref 0–40)
WBC # BLD AUTO: 6.5 X10*3/UL (ref 4.4–11.3)

## 2024-10-02 PROCEDURE — 99214 OFFICE O/P EST MOD 30 MIN: CPT | Performed by: INTERNAL MEDICINE

## 2024-10-02 PROCEDURE — 3077F SYST BP >= 140 MM HG: CPT | Performed by: INTERNAL MEDICINE

## 2024-10-02 PROCEDURE — 82570 ASSAY OF URINE CREATININE: CPT

## 2024-10-02 PROCEDURE — 36415 COLL VENOUS BLD VENIPUNCTURE: CPT

## 2024-10-02 PROCEDURE — 81001 URINALYSIS AUTO W/SCOPE: CPT | Performed by: INTERNAL MEDICINE

## 2024-10-02 PROCEDURE — 3079F DIAST BP 80-89 MM HG: CPT | Performed by: INTERNAL MEDICINE

## 2024-10-02 PROCEDURE — 82043 UR ALBUMIN QUANTITATIVE: CPT

## 2024-10-02 PROCEDURE — 3008F BODY MASS INDEX DOCD: CPT | Performed by: INTERNAL MEDICINE

## 2024-10-02 PROCEDURE — 82306 VITAMIN D 25 HYDROXY: CPT

## 2024-10-02 PROCEDURE — 84443 ASSAY THYROID STIM HORMONE: CPT

## 2024-10-02 PROCEDURE — 85027 COMPLETE CBC AUTOMATED: CPT

## 2024-10-02 PROCEDURE — 80053 COMPREHEN METABOLIC PANEL: CPT

## 2024-10-02 PROCEDURE — 80061 LIPID PANEL: CPT

## 2024-10-02 RX ORDER — LOSARTAN POTASSIUM AND HYDROCHLOROTHIAZIDE 12.5; 5 MG/1; MG/1
1 TABLET ORAL DAILY
Qty: 90 TABLET | Refills: 1 | Status: SHIPPED | OUTPATIENT
Start: 2024-10-02

## 2024-10-02 RX ORDER — ATORVASTATIN CALCIUM 20 MG/1
20 TABLET, FILM COATED ORAL DAILY
Qty: 90 TABLET | Refills: 1 | Status: SHIPPED | OUTPATIENT
Start: 2024-10-02

## 2024-10-02 ASSESSMENT — ENCOUNTER SYMPTOMS
WOUND: 0
NUMBNESS: 0
CONFUSION: 0
CHILLS: 0
EYE DISCHARGE: 0
WHEEZING: 0
DIZZINESS: 0
SHORTNESS OF BREATH: 0
DIFFICULTY URINATING: 0
FEVER: 0
DYSURIA: 0
MYALGIAS: 0
NAUSEA: 0
COUGH: 0
FLANK PAIN: 0
VOMITING: 0
ABDOMINAL PAIN: 0
DIARRHEA: 0

## 2024-10-02 ASSESSMENT — LIFESTYLE VARIABLES
HOW OFTEN DO YOU HAVE A DRINK CONTAINING ALCOHOL: MONTHLY OR LESS
HOW OFTEN DO YOU HAVE SIX OR MORE DRINKS ON ONE OCCASION: NEVER
HOW MANY STANDARD DRINKS CONTAINING ALCOHOL DO YOU HAVE ON A TYPICAL DAY: 1 OR 2
AUDIT-C TOTAL SCORE: 1
SKIP TO QUESTIONS 9-10: 1

## 2024-10-02 NOTE — ASSESSMENT & PLAN NOTE
- Gained weight since last visit  - Pt planned to start exercising more and improve diet  - If weight and A1c do not improve or worsen at next in office visit consider adding metformin for prediabetes and weight management

## 2024-10-02 NOTE — ASSESSMENT & PLAN NOTE
- A1c 5.9%, no medications  - Pt planned to start exercising more and improve her diet  - Will recheck A1c today  - If weight and A1c do not improve or worsen at next in office visit consider adding metformin for prediabetes and weight management

## 2024-10-02 NOTE — PROGRESS NOTES
Subjective   Subjective:     History Of Present Illness:  Jami Craig is a 59 y.o. female with a PMH of HLD, HTN, obesity with hepatic steatosis, pre-diabetes, and nicotine dependency (cigarettes), who presents to [facility] for follow up.  Today patient denies experiencing any acute medical issues.  She continues to smoke 15 cigarettes daily and reports weight gain since last office visit.  She is planning on exercising more and improving her diet by eliminating carbs and decreasing food portions.  Education on weight management, smoking cessation, exercise and diet provided.  Denies shortness of breath, difficulties breathing, chest pain, abdominal pain, nausea, vomiting, diarrhea, fever, and chills.    Past Medical History:  She has a past medical history of Body mass index (BMI) 36.0-36.9, adult (08/11/2021), Body mass index (BMI) 37.0-37.9, adult (12/17/2021), Body mass index (BMI) 38.0-38.9, adult (10/14/2020), Body mass index (BMI)40.0-44.9, adult (01/11/2021), Morbid (severe) obesity due to excess calories (Multi) (12/17/2021), Personal history of other diseases of the female genital tract (03/25/2014), Personal history of other diseases of the musculoskeletal system and connective tissue (03/25/2014), Personal history of other specified conditions (03/25/2014), and Personal history of other specified conditions (03/25/2014).    Past Surgical History:  She has no past surgical history on file.     Social History:  She reports that she has been smoking cigarettes. She has been exposed to tobacco smoke. She has never used smokeless tobacco. She reports current alcohol use. She reports that she does not use drugs.    Family History:  Family History   Problem Relation Name Age of Onset   • Other (HTN) Mother     • Atrial fibrillation Mother     • Other (HTN [Other]) Father     • Other (ISCHEMIC HEART DISEASE) Father       Allergies:  Patient has no known allergies.    Home Medications:  (Not in a  "hospital admission)    Review Of Systems:  11-point ROS was performed and is negative except as noted below and in the HPI.     Review of Systems   Constitutional:  Negative for chills and fever.   Eyes:  Negative for discharge.   Respiratory:  Negative for cough, shortness of breath and wheezing.    Cardiovascular:  Negative for chest pain and leg swelling.   Gastrointestinal:  Negative for abdominal pain, diarrhea, nausea and vomiting.   Genitourinary:  Negative for difficulty urinating, dysuria and flank pain.   Musculoskeletal:  Negative for myalgias.   Skin:  Negative for wound.   Neurological:  Negative for dizziness and numbness.   Psychiatric/Behavioral:  Negative for confusion.         Objective   Objective:     /80 (BP Location: Right arm, Patient Position: Sitting, BP Cuff Size: Large adult)   Pulse 82   Ht 1.727 m (5' 8\")   Wt 111 kg (244 lb)   BMI 37.10 kg/m²     Physical Exam  Constitutional:       General: She is not in acute distress.     Appearance: She is obese.   HENT:      Head: Normocephalic and atraumatic.      Mouth/Throat:      Mouth: Mucous membranes are moist.   Eyes:      Conjunctiva/sclera: Conjunctivae normal.      Pupils: Pupils are equal, round, and reactive to light.   Cardiovascular:      Rate and Rhythm: Normal rate and regular rhythm.      Heart sounds: Normal heart sounds.   Pulmonary:      Effort: No respiratory distress.      Breath sounds: Normal breath sounds. No wheezing or rhonchi.   Abdominal:      General: Bowel sounds are normal.      Palpations: Abdomen is soft.      Tenderness: There is no abdominal tenderness.   Musculoskeletal:         General: No swelling.      Cervical back: Neck supple.   Skin:     General: Skin is warm and dry.   Neurological:      General: No focal deficit present.      Mental Status: She is alert.          Assessment & Plan:     Assessment/Plan     Problem List Items Addressed This Visit       Benign hypertension - Primary     - " continue Hyzaar 50/12.5 mg, refills sent  - today /80 on manual, checks at home         Relevant Medications    losartan-hydrochlorothiazide (Hyzaar) 50-12.5 mg tablet    Other Relevant Orders    CBC    Comprehensive Metabolic Panel    Lipid Panel    Microscopic Only, Urine    TSH with reflex to Free T4 if abnormal    Albumin-Creatinine Ratio, Urine Random    Vitamin D 25-Hydroxy,Total (for eval of Vitamin D levels)    Pure hypercholesterolemia     - continue Lipitor 20 mg   - lipid check today         Relevant Medications    atorvastatin (Lipitor) 20 mg tablet    Other Relevant Orders    CBC    Comprehensive Metabolic Panel    Lipid Panel    Microscopic Only, Urine    TSH with reflex to Free T4 if abnormal    Albumin-Creatinine Ratio, Urine Random    Vitamin D 25-Hydroxy,Total (for eval of Vitamin D levels)    Pre-diabetes     - A1c 5.9%, no medications  - Pt planned to start exercising more and improve her diet  - Will recheck A1c today         Obesity (BMI 30-39.9)     - Gained weight since last visit  - Pt planned to start exercising more and improve diet          #Health Maintenance:  -Routine labs ordered today.  -CT cardiac scoring: normal  -Low dose CT scan: no suspicion for malignancy  -Mammogram: normal, next due 7/2025  -PAP smear: normal, next due 6/2025  -Colonoscopy done 2016 - normal, next due 2026    Revisit Topics: Weight management and Pre-diabetes.  (if weight and A1c do not improve or worsen at next in office visit consider adding metformin for prediabetes and weight management).    Dispo: Patient is scheduled to return to clinic in 3 months.    Renny Zamarripa, PGY-3  Internal Medicine     Disclaimer: Documentation completed with the information available at the time of input. The times in the chart may not be reflective of actual patient care times, interventions, or procedures. Documentation occurs after the physical care of the patient.

## 2024-10-03 LAB
BACTERIA #/AREA URNS AUTO: ABNORMAL /HPF
RBC #/AREA URNS AUTO: ABNORMAL /HPF
SQUAMOUS #/AREA URNS AUTO: ABNORMAL /HPF
WBC #/AREA URNS AUTO: ABNORMAL /HPF

## 2025-01-24 ENCOUNTER — APPOINTMENT (OUTPATIENT)
Dept: PRIMARY CARE | Facility: CLINIC | Age: 60
End: 2025-01-24
Payer: COMMERCIAL

## 2025-01-24 VITALS
HEIGHT: 68 IN | BODY MASS INDEX: 34.56 KG/M2 | SYSTOLIC BLOOD PRESSURE: 106 MMHG | WEIGHT: 228 LBS | DIASTOLIC BLOOD PRESSURE: 73 MMHG | HEART RATE: 97 BPM

## 2025-01-24 DIAGNOSIS — E66.9 OBESITY (BMI 30-39.9): ICD-10-CM

## 2025-01-24 DIAGNOSIS — R73.03 PRE-DIABETES: ICD-10-CM

## 2025-01-24 DIAGNOSIS — E78.00 PURE HYPERCHOLESTEROLEMIA: ICD-10-CM

## 2025-01-24 DIAGNOSIS — I10 BENIGN HYPERTENSION: ICD-10-CM

## 2025-01-24 DIAGNOSIS — E78.2 MIXED HYPERLIPIDEMIA: Primary | ICD-10-CM

## 2025-01-24 DIAGNOSIS — F17.200 SMOKING: ICD-10-CM

## 2025-01-24 PROBLEM — R50.9 FEVER WITH CHILLS: Status: RESOLVED | Noted: 2023-02-07 | Resolved: 2025-01-24

## 2025-01-24 PROBLEM — E78.1 HYPERTRIGLYCERIDEMIA: Status: RESOLVED | Noted: 2023-02-07 | Resolved: 2025-01-24

## 2025-01-24 PROBLEM — R20.2 TINGLING OF UPPER EXTREMITY: Status: RESOLVED | Noted: 2023-02-07 | Resolved: 2025-01-24

## 2025-01-24 PROBLEM — R06.2 WHEEZE: Status: RESOLVED | Noted: 2023-02-07 | Resolved: 2025-01-24

## 2025-01-24 PROBLEM — R43.2 TASTE IMPAIRMENT: Status: RESOLVED | Noted: 2023-02-07 | Resolved: 2025-01-24

## 2025-01-24 PROBLEM — K62.5 RECTAL BLEED: Status: RESOLVED | Noted: 2023-02-07 | Resolved: 2025-01-24

## 2025-01-24 PROBLEM — R51.9 HEADACHE: Status: RESOLVED | Noted: 2023-02-07 | Resolved: 2025-01-24

## 2025-01-24 PROBLEM — M25.511 RIGHT SHOULDER PAIN: Status: RESOLVED | Noted: 2023-02-07 | Resolved: 2025-01-24

## 2025-01-24 PROBLEM — M25.562 KNEE PAIN, LEFT: Status: RESOLVED | Noted: 2023-02-07 | Resolved: 2025-01-24

## 2025-01-24 PROCEDURE — 3078F DIAST BP <80 MM HG: CPT | Performed by: INTERNAL MEDICINE

## 2025-01-24 PROCEDURE — 3008F BODY MASS INDEX DOCD: CPT | Performed by: INTERNAL MEDICINE

## 2025-01-24 PROCEDURE — 99214 OFFICE O/P EST MOD 30 MIN: CPT | Performed by: INTERNAL MEDICINE

## 2025-01-24 PROCEDURE — 3074F SYST BP LT 130 MM HG: CPT | Performed by: INTERNAL MEDICINE

## 2025-01-24 RX ORDER — ATORVASTATIN CALCIUM 20 MG/1
20 TABLET, FILM COATED ORAL DAILY
Qty: 90 TABLET | Refills: 1 | Status: SHIPPED | OUTPATIENT
Start: 2025-01-24

## 2025-01-24 RX ORDER — LOSARTAN POTASSIUM AND HYDROCHLOROTHIAZIDE 12.5; 5 MG/1; MG/1
1 TABLET ORAL DAILY
Qty: 90 TABLET | Refills: 1 | Status: SHIPPED | OUTPATIENT
Start: 2025-01-24

## 2025-01-24 ASSESSMENT — ENCOUNTER SYMPTOMS
FLANK PAIN: 0
EYE DISCHARGE: 0
MYALGIAS: 0
VOMITING: 0
NAUSEA: 0
WHEEZING: 0
ABDOMINAL PAIN: 0
DIFFICULTY URINATING: 0
CONFUSION: 0
DIARRHEA: 0
DIZZINESS: 0
CHILLS: 0
NUMBNESS: 0
WOUND: 0
SHORTNESS OF BREATH: 0
COUGH: 0
FEVER: 0
DYSURIA: 0

## 2025-01-24 ASSESSMENT — LIFESTYLE VARIABLES
HOW OFTEN DO YOU HAVE A DRINK CONTAINING ALCOHOL: MONTHLY OR LESS
HOW MANY STANDARD DRINKS CONTAINING ALCOHOL DO YOU HAVE ON A TYPICAL DAY: 1 OR 2
SKIP TO QUESTIONS 9-10: 1
HOW OFTEN DO YOU HAVE SIX OR MORE DRINKS ON ONE OCCASION: NEVER
AUDIT-C TOTAL SCORE: 1

## 2025-01-24 NOTE — ASSESSMENT & PLAN NOTE
-Patient wants to control her weight with lifestyle modifications such as diet and exercise  -Weight improved, patient lost 14 pounds in the past 3 months

## 2025-01-24 NOTE — ASSESSMENT & PLAN NOTE
-Not on any medications  -Last A1c 5.9% 11-month ago  -Repeat A1c today  -Consider adding metformin 500 mg daily if A1c continues to rise

## 2025-01-24 NOTE — PROGRESS NOTES
Subjective   Subjective:     History Of Present Illness:  Jami Craig is a 59 y.o. female with a PMH of HLD, HTN, obesity with hepatic steatosis, pre-diabetes, and nicotine dependency (cigarettes), who presents to [facility] for follow up.  Patient lost 14 pounds since October 2024 by controlling her diet and eliminating simple carbs from her diet.  Additionally states that she cut down on smoking cigarettes and smokes only 10 cigarettes daily.  Denies any medical symptoms.  Would like to have medications refilled today.    Per prior office visit    Last seen October 2024.  Patient denies experiencing any acute medical issues.  She continues to smoke 15 cigarettes daily and reports weight gain since last office visit.  She is planning on exercising more and improving her diet by eliminating carbs and decreasing food portions.  Education on weight management, smoking cessation, exercise and diet provided.  Denies shortness of breath, difficulties breathing, chest pain, abdominal pain, nausea, vomiting, diarrhea, fever, and chills.    Past Medical History:  She has a past medical history of Body mass index (BMI) 36.0-36.9, adult (08/11/2021), Body mass index (BMI) 37.0-37.9, adult (12/17/2021), Body mass index (BMI) 38.0-38.9, adult (10/14/2020), Body mass index (BMI)40.0-44.9, adult (01/11/2021), Morbid (severe) obesity due to excess calories (Multi) (12/17/2021), Personal history of other diseases of the female genital tract (03/25/2014), Personal history of other diseases of the musculoskeletal system and connective tissue (03/25/2014), Personal history of other specified conditions (03/25/2014), and Personal history of other specified conditions (03/25/2014).    Past Surgical History:  She has no past surgical history on file.     Social History:  She reports that she has been smoking cigarettes. She has been exposed to tobacco smoke. She has never used smokeless tobacco. She reports current alcohol  "use. She reports that she does not use drugs.    Family History:  Family History   Problem Relation Name Age of Onset    Other (HTN) Mother      Atrial fibrillation Mother      Other (HTN [Other]) Father      Other (ISCHEMIC HEART DISEASE) Father       Allergies:  Patient has no known allergies.    Home Medications:  (Not in a hospital admission)    Review Of Systems:  11-point ROS was performed and is negative except as noted below and in the HPI.     Review of Systems   Constitutional:  Negative for chills and fever.   Eyes:  Negative for discharge.   Respiratory:  Negative for cough, shortness of breath and wheezing.    Cardiovascular:  Negative for chest pain and leg swelling.   Gastrointestinal:  Negative for abdominal pain, diarrhea, nausea and vomiting.   Genitourinary:  Negative for difficulty urinating, dysuria and flank pain.   Musculoskeletal:  Negative for myalgias.   Skin:  Negative for wound.   Neurological:  Negative for dizziness and numbness.   Psychiatric/Behavioral:  Negative for confusion.         Objective   Objective:     /73 (BP Location: Left arm, Patient Position: Sitting, BP Cuff Size: Large adult)   Pulse 97   Ht 1.727 m (5' 8\")   Wt 103 kg (228 lb)   BMI 34.67 kg/m²     Physical Exam  Constitutional:       General: She is not in acute distress.     Appearance: She is obese.   HENT:      Head: Normocephalic and atraumatic.      Mouth/Throat:      Mouth: Mucous membranes are moist.   Eyes:      Conjunctiva/sclera: Conjunctivae normal.      Pupils: Pupils are equal, round, and reactive to light.   Cardiovascular:      Rate and Rhythm: Normal rate and regular rhythm.      Heart sounds: Normal heart sounds.   Pulmonary:      Effort: No respiratory distress.      Breath sounds: Normal breath sounds. No wheezing or rhonchi.   Abdominal:      General: Bowel sounds are normal.      Palpations: Abdomen is soft.      Tenderness: There is no abdominal tenderness.   Musculoskeletal:         " General: No swelling.      Cervical back: Neck supple.   Skin:     General: Skin is warm and dry.   Neurological:      General: No focal deficit present.      Mental Status: She is alert.          Assessment & Plan:     Assessment/Plan     Problem List Items Addressed This Visit       Benign hypertension     -Well-controlled with losartan hydrochlorothiazide 50/12.5 mg         Relevant Medications    losartan-hydrochlorothiazide (Hyzaar) 50-12.5 mg tablet    Smoking     -Last office visit patient reported smoking 15 cigarettes daily  -Today patient reports smoking only 10 cigarettes daily  -Smoking cessation education provided         Pre-diabetes     -Not on any medications  -Last A1c 5.9% 11-month ago  -Repeat A1c today  -Consider adding metformin 500 mg daily if A1c continues to rise         Relevant Orders    POCT glycosylated hemoglobin (Hb A1C) manually resulted    Obesity (BMI 30-39.9)     -Patient wants to control her weight with lifestyle modifications such as diet and exercise  -Weight improved, patient lost 14 pounds in the past 3 months         Mixed hyperlipidemia - Primary     -Well-controlled with atorvastatin 20 mg  -Repeat lipid panel in May          Other Visit Diagnoses       Pure hypercholesterolemia        Relevant Medications    atorvastatin (Lipitor) 20 mg tablet            #Health Maintenance:  - Routine labs due in May and physical exam  - PAP smear: normal  - next due 6/2025  - Mammogram: normal - next due 7/2025  - Low dose CT scan: normal - next due 9/2025  - Colonoscopy done 2016 - normal, next due 2026  - CT cardiac scoring done 9/2024: normal, 0 score    Revisit Topics: Weight management and pre-diabetes    Dispo: Patient is scheduled to return to clinic in May for physical exam.    Renny Zamarripa, PGY-3  Internal Medicine     Disclaimer: Documentation completed with the information available at the time of input. The times in the chart may not be reflective of actual patient care  times, interventions, or procedures. Documentation occurs after the physical care of the patient.

## 2025-01-24 NOTE — ASSESSMENT & PLAN NOTE
-Last office visit patient reported smoking 15 cigarettes daily  -Today patient reports smoking only 10 cigarettes daily  -Smoking cessation education provided

## 2025-05-15 ASSESSMENT — PROMIS GLOBAL HEALTH SCALE
RATE_PHYSICAL_HEALTH: VERY GOOD
RATE_SOCIAL_SATISFACTION: EXCELLENT
RATE_GENERAL_HEALTH: VERY GOOD
CARRYOUT_SOCIAL_ACTIVITIES: EXCELLENT
EMOTIONAL_PROBLEMS: NEVER
CARRYOUT_PHYSICAL_ACTIVITIES: COMPLETELY
RATE_MENTAL_HEALTH: EXCELLENT
RATE_AVERAGE_PAIN: 0
RATE_QUALITY_OF_LIFE: EXCELLENT

## 2025-05-16 ENCOUNTER — APPOINTMENT (OUTPATIENT)
Dept: PRIMARY CARE | Facility: CLINIC | Age: 60
End: 2025-05-16
Payer: COMMERCIAL

## 2025-05-16 VITALS
HEART RATE: 81 BPM | HEIGHT: 68 IN | WEIGHT: 213 LBS | BODY MASS INDEX: 32.28 KG/M2 | DIASTOLIC BLOOD PRESSURE: 82 MMHG | SYSTOLIC BLOOD PRESSURE: 125 MMHG

## 2025-05-16 DIAGNOSIS — E78.00 PURE HYPERCHOLESTEROLEMIA: ICD-10-CM

## 2025-05-16 DIAGNOSIS — H65.22 LEFT CHRONIC SEROUS OTITIS MEDIA: Primary | ICD-10-CM

## 2025-05-16 DIAGNOSIS — I10 BENIGN HYPERTENSION: ICD-10-CM

## 2025-05-16 PROCEDURE — 99213 OFFICE O/P EST LOW 20 MIN: CPT | Performed by: INTERNAL MEDICINE

## 2025-05-16 PROCEDURE — 99396 PREV VISIT EST AGE 40-64: CPT | Performed by: INTERNAL MEDICINE

## 2025-05-16 PROCEDURE — 3079F DIAST BP 80-89 MM HG: CPT | Performed by: INTERNAL MEDICINE

## 2025-05-16 PROCEDURE — 3008F BODY MASS INDEX DOCD: CPT | Performed by: INTERNAL MEDICINE

## 2025-05-16 PROCEDURE — 3074F SYST BP LT 130 MM HG: CPT | Performed by: INTERNAL MEDICINE

## 2025-05-16 RX ORDER — OFLOXACIN 3 MG/ML
5 SOLUTION AURICULAR (OTIC) 2 TIMES DAILY
Qty: 0.35 ML | Refills: 0 | Status: SHIPPED | OUTPATIENT
Start: 2025-05-16 | End: 2025-05-23

## 2025-05-16 RX ORDER — ATORVASTATIN CALCIUM 20 MG/1
20 TABLET, FILM COATED ORAL DAILY
Qty: 90 TABLET | Refills: 1 | Status: SHIPPED | OUTPATIENT
Start: 2025-05-16

## 2025-05-16 RX ORDER — LOSARTAN POTASSIUM AND HYDROCHLOROTHIAZIDE 12.5; 5 MG/1; MG/1
1 TABLET ORAL DAILY
Qty: 90 TABLET | Refills: 1 | Status: SHIPPED | OUTPATIENT
Start: 2025-05-16

## 2025-05-16 ASSESSMENT — ENCOUNTER SYMPTOMS
ABDOMINAL PAIN: 0
FLANK PAIN: 0
WHEEZING: 0
RHINORRHEA: 0
VOMITING: 0
NAUSEA: 0
CONFUSION: 0
FEVER: 0
NUMBNESS: 0
SINUS PAIN: 0
DIARRHEA: 0
DIZZINESS: 0
CHILLS: 0
SHORTNESS OF BREATH: 0
DIFFICULTY URINATING: 0
EYE DISCHARGE: 0
COUGH: 0
MYALGIAS: 0
WOUND: 0
SINUS PRESSURE: 0
DYSURIA: 0

## 2025-05-16 ASSESSMENT — LIFESTYLE VARIABLES
HOW MANY STANDARD DRINKS CONTAINING ALCOHOL DO YOU HAVE ON A TYPICAL DAY: 1 OR 2
HOW OFTEN DO YOU HAVE SIX OR MORE DRINKS ON ONE OCCASION: NEVER
AUDIT-C TOTAL SCORE: 2
HOW OFTEN DO YOU HAVE A DRINK CONTAINING ALCOHOL: 2-4 TIMES A MONTH
SKIP TO QUESTIONS 9-10: 1

## 2025-05-16 NOTE — PROGRESS NOTES
Subjective   Subjective:     History Of Present Illness:  Jami Craig is a 59 y.o. female with a PMH of HLD, HTN, obesity with hepatic steatosis, pre-diabetes, and nicotine dependency (cigarettes), who presents to [facility] clinic for a physical exam. She has been complaining of left ear fullness sensation associated with ringing sensation. She was prescribed abx last month from urgent clinic due to ear pain and erythema in the left ear without significant resolution. Denies ear discharge, loss of hearing, pain, erythema, fever and chills.     Past Medical History:  She has a past medical history of Body mass index (BMI) 36.0-36.9, adult (08/11/2021), Body mass index (BMI) 37.0-37.9, adult (12/17/2021), Body mass index (BMI) 38.0-38.9, adult (10/14/2020), Body mass index (BMI)40.0-44.9, adult (01/11/2021), Morbid (severe) obesity due to excess calories (Multi) (12/17/2021), Personal history of other diseases of the female genital tract (03/25/2014), Personal history of other diseases of the musculoskeletal system and connective tissue (03/25/2014), Personal history of other specified conditions (03/25/2014), and Personal history of other specified conditions (03/25/2014).    Past Surgical History:  She has no past surgical history on file.     Social History:  She reports that she has been smoking cigarettes. She has been exposed to tobacco smoke. She has never used smokeless tobacco. She reports current alcohol use. She reports that she does not use drugs.    Family History:  Family History[1]  Allergies:  Patient has no known allergies.    Home Medications:  Prescriptions Prior to Admission[2]  Review Of Systems:  11-point ROS was performed and is negative except as noted below and in the HPI.     Review of Systems   Constitutional:  Negative for chills and fever.   HENT:  Positive for ear pain (pressure-like sensation) and tinnitus. Negative for ear discharge, hearing loss, rhinorrhea, sinus pressure and  "sinus pain.    Eyes:  Negative for discharge.   Respiratory:  Negative for cough, shortness of breath and wheezing.    Cardiovascular:  Negative for chest pain and leg swelling.   Gastrointestinal:  Negative for abdominal pain, diarrhea, nausea and vomiting.   Genitourinary:  Negative for difficulty urinating, dysuria and flank pain.   Musculoskeletal:  Negative for myalgias.   Skin:  Negative for wound.   Neurological:  Negative for dizziness and numbness.   Psychiatric/Behavioral:  Negative for confusion.         Objective   Objective:     /82 (BP Location: Left arm, Patient Position: Sitting, BP Cuff Size: Large adult)   Pulse 81   Ht 1.727 m (5' 8\")   Wt 96.6 kg (213 lb)   BMI 32.39 kg/m²     Physical Exam  Constitutional:       General: She is not in acute distress.     Appearance: Normal appearance.   HENT:      Head: Normocephalic and atraumatic.      Right Ear: Tympanic membrane, ear canal and external ear normal. There is no impacted cerumen.      Left Ear: Tympanic membrane, ear canal and external ear normal. There is no impacted cerumen.      Ears:      Comments: +left ear fullness with mild erythema     Nose: Nose normal.      Mouth/Throat:      Mouth: Mucous membranes are moist.   Eyes:      Conjunctiva/sclera: Conjunctivae normal.      Pupils: Pupils are equal, round, and reactive to light.   Cardiovascular:      Rate and Rhythm: Normal rate and regular rhythm.      Heart sounds: Normal heart sounds.   Pulmonary:      Effort: Pulmonary effort is normal. No respiratory distress.      Breath sounds: Normal breath sounds.   Abdominal:      General: Bowel sounds are normal. There is no distension.      Palpations: Abdomen is soft.      Tenderness: There is no abdominal tenderness.   Musculoskeletal:         General: No swelling.      Right lower leg: No edema.      Left lower leg: No edema.   Skin:     General: Skin is warm and dry.      Coloration: Skin is not jaundiced.   Neurological:      " General: No focal deficit present.      Mental Status: She is alert. Mental status is at baseline.          Assessment & Plan:     Assessment/Plan     Problem List Items Addressed This Visit       Benign hypertension    Relevant Medications    losartan-hydrochlorothiazide (Hyzaar) 50-12.5 mg tablet    Other Relevant Orders    CBC    Comprehensive Metabolic Panel    Albumin-Creatinine Ratio, Urine Random    Microscopic Only, Urine    Vitamin D 25-Hydroxy,Total (for eval of Vitamin D levels)    Hemoglobin A1C    Pure hypercholesterolemia    - Lipid panel check in 1 month  - Continue taking Lipitor 20 mg, refills sent         Relevant Medications    atorvastatin (Lipitor) 20 mg tablet    Other Relevant Orders    TSH with reflex to Free T4 if abnormal    Lipid Panel    Left chronic serous otitis media - Primary    - Start ofloxacin drops         Relevant Medications    ofloxacin (Floxin) 0.3 % otic solution     #Health Maintenance:  - Routine labs ordered in 1 months  - Rx refills sent    Revisit Topics: Left ear pressure/fullness sensation    Dispo: Patient is scheduled to return to clinic in September.    Renny Zamarripa, PGY-3  Internal Medicine     Disclaimer: Documentation completed with the information available at the time of input. The times in the chart may not be reflective of actual patient care times, interventions, or procedures. Documentation occurs after the physical care of the patient.        [1]   Family History  Problem Relation Name Age of Onset    Other (HTN) Mother      Atrial fibrillation Mother      Other (HTN [Other]) Father      Other (ISCHEMIC HEART DISEASE) Father     [2] (Not in a hospital admission)

## 2025-09-04 LAB
25(OH)D3+25(OH)D2 SERPL-MCNC: 54 NG/ML (ref 30–100)
ALBUMIN SERPL-MCNC: 4.5 G/DL (ref 3.6–5.1)
ALBUMIN/CREAT UR: ABNORMAL MG/G CREAT
ALP SERPL-CCNC: 90 U/L (ref 37–153)
ALT SERPL-CCNC: 27 U/L (ref 6–29)
ANION GAP SERPL CALCULATED.4IONS-SCNC: 10 MMOL/L (CALC) (ref 7–17)
AST SERPL-CCNC: 20 U/L (ref 10–35)
BILIRUB SERPL-MCNC: 0.5 MG/DL (ref 0.2–1.2)
BUN SERPL-MCNC: 12 MG/DL (ref 7–25)
CALCIUM SERPL-MCNC: 9.5 MG/DL (ref 8.6–10.4)
CHLORIDE SERPL-SCNC: 102 MMOL/L (ref 98–110)
CHOLEST SERPL-MCNC: 181 MG/DL
CHOLEST/HDLC SERPL: 3.1 (CALC)
CO2 SERPL-SCNC: 29 MMOL/L (ref 20–32)
CREAT SERPL-MCNC: 0.56 MG/DL (ref 0.5–1.03)
CREAT UR-MCNC: 19 MG/DL (ref 20–275)
EGFRCR SERPLBLD CKD-EPI 2021: 105 ML/MIN/1.73M2
ERYTHROCYTE [DISTWIDTH] IN BLOOD BY AUTOMATED COUNT: 12.7 % (ref 11–15)
EST. AVERAGE GLUCOSE BLD GHB EST-MCNC: 128 MG/DL
EST. AVERAGE GLUCOSE BLD GHB EST-SCNC: 7.1 MMOL/L
GLUCOSE SERPL-MCNC: 99 MG/DL (ref 65–99)
HBA1C MFR BLD: 6.1 %
HCT VFR BLD AUTO: 41.4 % (ref 35–45)
HDLC SERPL-MCNC: 58 MG/DL
HGB BLD-MCNC: 13.9 G/DL (ref 11.7–15.5)
LDLC SERPL CALC-MCNC: 96 MG/DL (CALC)
MCH RBC QN AUTO: 32.3 PG (ref 27–33)
MCHC RBC AUTO-ENTMCNC: 33.6 G/DL (ref 32–36)
MCV RBC AUTO: 96.1 FL (ref 80–100)
MICROALBUMIN UR-MCNC: <0.2 MG/DL
NONHDLC SERPL-MCNC: 123 MG/DL (CALC)
PLATELET # BLD AUTO: 216 THOUSAND/UL (ref 140–400)
PMV BLD REES-ECKER: 10.6 FL (ref 7.5–12.5)
POTASSIUM SERPL-SCNC: 3.8 MMOL/L (ref 3.5–5.3)
PROT SERPL-MCNC: 6.9 G/DL (ref 6.1–8.1)
RBC # BLD AUTO: 4.31 MILLION/UL (ref 3.8–5.1)
SODIUM SERPL-SCNC: 141 MMOL/L (ref 135–146)
TRIGL SERPL-MCNC: 170 MG/DL
TSH SERPL-ACNC: 1.97 MIU/L (ref 0.4–4.5)
WBC # BLD AUTO: 5.5 THOUSAND/UL (ref 3.8–10.8)

## 2025-09-16 ENCOUNTER — APPOINTMENT (OUTPATIENT)
Dept: PRIMARY CARE | Facility: CLINIC | Age: 60
End: 2025-09-16
Payer: COMMERCIAL